# Patient Record
Sex: FEMALE | Race: WHITE | NOT HISPANIC OR LATINO | URBAN - METROPOLITAN AREA
[De-identification: names, ages, dates, MRNs, and addresses within clinical notes are randomized per-mention and may not be internally consistent; named-entity substitution may affect disease eponyms.]

---

## 2024-02-22 ENCOUNTER — OFFICE (OUTPATIENT)
Dept: URBAN - METROPOLITAN AREA CLINIC 11 | Facility: CLINIC | Age: 64
End: 2024-02-22

## 2024-02-22 VITALS
HEART RATE: 56 BPM | HEIGHT: 65 IN | OXYGEN SATURATION: 96 % | SYSTOLIC BLOOD PRESSURE: 138 MMHG | WEIGHT: 127 LBS | DIASTOLIC BLOOD PRESSURE: 88 MMHG

## 2024-02-22 DIAGNOSIS — R19.7 DIARRHEA, UNSPECIFIED: ICD-10-CM

## 2024-02-22 DIAGNOSIS — R14.3 FLATULENCE: ICD-10-CM

## 2024-02-22 PROCEDURE — 99204 OFFICE O/P NEW MOD 45 MIN: CPT | Performed by: NURSE PRACTITIONER

## 2024-02-22 RX ORDER — SODIUM PICOSULFATE, MAGNESIUM OXIDE, AND ANHYDROUS CITRIC ACID 10; 3.5; 12 MG/160ML; G/160ML; G/160ML
LIQUID ORAL
Qty: 350 | Refills: 0 | Status: COMPLETED
Start: 2024-02-22 | End: 2024-04-11

## 2024-02-22 NOTE — SERVICENOTES
Will check the above the further eval diarrhea. Will get LO as well. will see back in 3 months or sooner if needed

## 2024-02-22 NOTE — SERVICEHPINOTES
Ms. Garcia is a 63 year old female here today with complaints of diarrhea. She reports in 4/2023  she started having diarrhea that resolved after a couple of days.  She thought she had a stomach bug at that time.   She started having diarrhea again in May 2023 and had positive stool studies and was treated with antibiotics.   A few weeks later the diarrhea returned and her PCP told her to stop eating so much fiber.   She is continued to have explosive diarrhea when she wakes up almost daily since May 2023.   She occasionally will have a small episode of diarrhea after eating.  she saw her PCP in December 2023 and had negative stool studies and had a CT scan that showed a prolapsed bladder and possible pelvic floor dysfunction.  She has been in pelvic floor therapy but continues to have diarrhea.   She stepped on a nail last week and started taking Keflex.  She reports her stools are thicker now.  She has had significant gas for the past year as well.  She denies any nausea, vomiting, blood in stool, abdominal pain.  She states she has not had a normal bowel movement in over a year. She had unremarkable colonoscopy by Dr. Martinez on 04/15/14.

## 2024-04-10 ENCOUNTER — OFFICE (OUTPATIENT)
Dept: URBAN - METROPOLITAN AREA CLINIC 11 | Facility: CLINIC | Age: 64
End: 2024-04-10
Payer: COMMERCIAL

## 2024-04-10 DIAGNOSIS — R19.7 DIARRHEA, UNSPECIFIED: ICD-10-CM

## 2024-04-10 PROCEDURE — 91065 BREATH HYDROGEN/METHANE TEST: CPT | Performed by: NURSE PRACTITIONER

## 2024-04-11 ENCOUNTER — AMBULATORY SURGICAL CENTER (OUTPATIENT)
Dept: URBAN - METROPOLITAN AREA SURGERY 3 | Facility: SURGERY | Age: 64
End: 2024-04-11
Payer: MEDICARE

## 2024-04-11 ENCOUNTER — AMBULATORY SURGICAL CENTER (OUTPATIENT)
Dept: URBAN - METROPOLITAN AREA SURGERY 3 | Facility: SURGERY | Age: 64
End: 2024-04-11

## 2024-04-11 ENCOUNTER — OFFICE (OUTPATIENT)
Dept: URBAN - METROPOLITAN AREA PATHOLOGY 12 | Facility: PATHOLOGY | Age: 64
End: 2024-04-11
Payer: COMMERCIAL

## 2024-04-11 ENCOUNTER — AMBULATORY SURGICAL CENTER (OUTPATIENT)
Dept: URBAN - METROPOLITAN AREA SURGERY 3 | Facility: SURGERY | Age: 64
End: 2024-04-11
Payer: COMMERCIAL

## 2024-04-11 VITALS
OXYGEN SATURATION: 92 % | HEART RATE: 57 BPM | DIASTOLIC BLOOD PRESSURE: 80 MMHG | HEIGHT: 65 IN | OXYGEN SATURATION: 96 % | HEIGHT: 65 IN | HEART RATE: 62 BPM | DIASTOLIC BLOOD PRESSURE: 78 MMHG | TEMPERATURE: 98.4 F | DIASTOLIC BLOOD PRESSURE: 57 MMHG | HEART RATE: 55 BPM | RESPIRATION RATE: 20 BRPM | RESPIRATION RATE: 16 BRPM | HEART RATE: 52 BPM | HEART RATE: 62 BPM | OXYGEN SATURATION: 96 % | OXYGEN SATURATION: 94 % | WEIGHT: 125 LBS | RESPIRATION RATE: 16 BRPM | TEMPERATURE: 98.3 F | DIASTOLIC BLOOD PRESSURE: 78 MMHG | DIASTOLIC BLOOD PRESSURE: 92 MMHG | RESPIRATION RATE: 18 BRPM | RESPIRATION RATE: 18 BRPM | SYSTOLIC BLOOD PRESSURE: 128 MMHG | HEART RATE: 55 BPM | SYSTOLIC BLOOD PRESSURE: 123 MMHG | HEART RATE: 55 BPM | SYSTOLIC BLOOD PRESSURE: 121 MMHG | SYSTOLIC BLOOD PRESSURE: 128 MMHG | SYSTOLIC BLOOD PRESSURE: 130 MMHG | RESPIRATION RATE: 20 BRPM | SYSTOLIC BLOOD PRESSURE: 130 MMHG | TEMPERATURE: 98.3 F | WEIGHT: 125 LBS | HEART RATE: 57 BPM | SYSTOLIC BLOOD PRESSURE: 132 MMHG | RESPIRATION RATE: 16 BRPM | DIASTOLIC BLOOD PRESSURE: 92 MMHG | HEART RATE: 62 BPM | TEMPERATURE: 98.3 F | OXYGEN SATURATION: 96 % | DIASTOLIC BLOOD PRESSURE: 67 MMHG | RESPIRATION RATE: 20 BRPM | DIASTOLIC BLOOD PRESSURE: 78 MMHG | DIASTOLIC BLOOD PRESSURE: 57 MMHG | WEIGHT: 125 LBS | SYSTOLIC BLOOD PRESSURE: 130 MMHG | SYSTOLIC BLOOD PRESSURE: 132 MMHG | HEART RATE: 52 BPM | RESPIRATION RATE: 18 BRPM | DIASTOLIC BLOOD PRESSURE: 92 MMHG | TEMPERATURE: 98.4 F | HEIGHT: 65 IN | DIASTOLIC BLOOD PRESSURE: 80 MMHG | SYSTOLIC BLOOD PRESSURE: 121 MMHG | HEART RATE: 57 BPM | OXYGEN SATURATION: 94 % | SYSTOLIC BLOOD PRESSURE: 123 MMHG | OXYGEN SATURATION: 92 % | OXYGEN SATURATION: 92 % | SYSTOLIC BLOOD PRESSURE: 132 MMHG | TEMPERATURE: 98.4 F | DIASTOLIC BLOOD PRESSURE: 67 MMHG | SYSTOLIC BLOOD PRESSURE: 128 MMHG | HEART RATE: 52 BPM | DIASTOLIC BLOOD PRESSURE: 80 MMHG | OXYGEN SATURATION: 94 % | DIASTOLIC BLOOD PRESSURE: 67 MMHG | DIASTOLIC BLOOD PRESSURE: 57 MMHG | SYSTOLIC BLOOD PRESSURE: 123 MMHG | SYSTOLIC BLOOD PRESSURE: 121 MMHG

## 2024-04-11 DIAGNOSIS — R19.7 DIARRHEA, UNSPECIFIED: ICD-10-CM

## 2024-04-11 DIAGNOSIS — K57.30 DIVERTICULOSIS OF LARGE INTESTINE WITHOUT PERFORATION OR ABS: ICD-10-CM

## 2024-04-11 PROBLEM — K52.89 CLINICALLY SIGNIFICANT DIARRHEA OF UNEXPLAINED ORIGIN: Status: ACTIVE | Noted: 2024-04-11

## 2024-04-11 PROCEDURE — 45380 COLONOSCOPY AND BIOPSY: CPT | Performed by: INTERNAL MEDICINE

## 2024-04-11 PROCEDURE — 88305 TISSUE EXAM BY PATHOLOGIST: CPT | Performed by: STUDENT IN AN ORGANIZED HEALTH CARE EDUCATION/TRAINING PROGRAM

## 2024-04-11 PROCEDURE — 88342 IMHCHEM/IMCYTCHM 1ST ANTB: CPT | Performed by: STUDENT IN AN ORGANIZED HEALTH CARE EDUCATION/TRAINING PROGRAM

## 2024-04-16 LAB
GASTRO ONE PATHOLOGY: PDF REPORT: (no result)

## 2024-05-23 ENCOUNTER — OFFICE (OUTPATIENT)
Dept: URBAN - METROPOLITAN AREA CLINIC 11 | Facility: CLINIC | Age: 64
End: 2024-05-23
Payer: COMMERCIAL

## 2024-05-23 VITALS
WEIGHT: 130 LBS | SYSTOLIC BLOOD PRESSURE: 134 MMHG | OXYGEN SATURATION: 100 % | HEIGHT: 65 IN | HEART RATE: 60 BPM | DIASTOLIC BLOOD PRESSURE: 81 MMHG

## 2024-05-23 DIAGNOSIS — R14.3 FLATULENCE: ICD-10-CM

## 2024-05-23 DIAGNOSIS — R19.7 DIARRHEA, UNSPECIFIED: ICD-10-CM

## 2024-05-23 DIAGNOSIS — K57.90 DIVERTICULOSIS OF INTESTINE, PART UNSPECIFIED, WITHOUT PERFO: ICD-10-CM

## 2024-05-23 PROCEDURE — 99213 OFFICE O/P EST LOW 20 MIN: CPT | Performed by: NURSE PRACTITIONER

## 2024-05-23 NOTE — SERVICEHPINOTES
Ms. Garcia is a 64 year old female here today with complaints of diarrhea. She reports in 4/2023  she started having diarrhea that resolved after a couple of days.  She thought she had a stomach bug at that time.   She started having diarrhea again in May 2023 and had positive stool studies and was treated with antibiotics.   A few weeks later the diarrhea returned and her PCP told her to stop eating so much fiber.   She is continued to have explosive diarrhea when she wakes up almost daily since May 2023.   She occasionally will have a small episode of diarrhea after eating.  she saw her PCP in December 2023 and had negative stool studies and had a CT scan that showed a prolapsed bladder and possible pelvic floor dysfunction.  She has been in pelvic floor therapy but continues to have diarrhea.   She stepped on a nail last week and started taking Keflex.  She reports her stools are thicker now.  She has had significant gas for the past year as well.  She denies any nausea, vomiting, blood in stool, abdominal pain.  She states she has not had a normal bowel movement in over a year. She had unremarkable colonoscopy by Dr. Martinez on 04/15/14.  She had a normal celiac panel and TSH on 2/23.  Her stool studies showed sapovirus.  She had a normal lactulose breath test on 04/10/2024. She had a colonoscopy on 04/11/2024 that showed mild diverticulosis of the sigmoid colon and a redundant colon but was otherwise unremarkable.  Pathology was benign.br 
s[facility]ana In follow up on 5/23/24,   Patient reports the diarrhea has resolved.  She states she read on the Internet that magnesium could help with insomnia.  She reports taking magnesium TID.  She was told by a provider, who she was unable to name, about 6 weeks ago to stop the magnesium.  After stopping the magnesium, she reports immediate resolution of diarrhea.  She now reports having a formed or loose bowel movement daily.  She denies any N/V, hematochezia, melena, or abdomen pain.  She denies any GERD symptoms, feeling that food gets stuck, or dysphagia.  She does report she will randomly have LLQ pain.  Not able to remember if the pain with constant or intermediate nor was she able to describe the pain.  States that she does eat a lot of nuts and popcorn but not sure what may trigger the pain.  The pain normally self resolves in a couple of days.

## 2024-05-23 NOTE — SERVICENOTES
Spoke with pt about LLQ pain.    Last colonoscopy showed diverticulosis.  Educated patient on diverticular diet and to try and document what she ate prior to the pain, description of the pain, and what makes the pain worse or better.